# Patient Record
Sex: FEMALE
[De-identification: names, ages, dates, MRNs, and addresses within clinical notes are randomized per-mention and may not be internally consistent; named-entity substitution may affect disease eponyms.]

---

## 2018-02-16 PROBLEM — Z00.00 ENCOUNTER FOR PREVENTIVE HEALTH EXAMINATION: Status: ACTIVE | Noted: 2018-02-16

## 2018-06-28 ENCOUNTER — APPOINTMENT (OUTPATIENT)
Dept: NEUROLOGY | Facility: CLINIC | Age: 68
End: 2018-06-28
Payer: MEDICARE

## 2018-06-28 VITALS
HEIGHT: 66 IN | WEIGHT: 160 LBS | SYSTOLIC BLOOD PRESSURE: 104 MMHG | HEART RATE: 63 BPM | OXYGEN SATURATION: 96 % | BODY MASS INDEX: 25.71 KG/M2 | DIASTOLIC BLOOD PRESSURE: 62 MMHG | TEMPERATURE: 97.6 F

## 2018-06-28 VITALS — SYSTOLIC BLOOD PRESSURE: 96 MMHG | HEART RATE: 61 BPM | OXYGEN SATURATION: 96 % | DIASTOLIC BLOOD PRESSURE: 58 MMHG

## 2018-06-28 DIAGNOSIS — G20 PARKINSON'S DISEASE: ICD-10-CM

## 2018-06-28 PROCEDURE — 99215 OFFICE O/P EST HI 40 MIN: CPT

## 2018-06-28 RX ORDER — ATENOLOL 25 MG/1
25 TABLET ORAL DAILY
Refills: 0 | Status: ACTIVE | COMMUNITY

## 2018-06-28 RX ORDER — ATORVASTATIN CALCIUM 10 MG/1
10 TABLET, FILM COATED ORAL DAILY
Refills: 0 | Status: ACTIVE | COMMUNITY

## 2018-07-05 ENCOUNTER — RX RENEWAL (OUTPATIENT)
Age: 68
End: 2018-07-05

## 2018-07-20 ENCOUNTER — RX RENEWAL (OUTPATIENT)
Age: 68
End: 2018-07-20

## 2018-08-22 ENCOUNTER — MEDICATION RENEWAL (OUTPATIENT)
Age: 68
End: 2018-08-22

## 2018-10-03 ENCOUNTER — APPOINTMENT (OUTPATIENT)
Dept: NEUROLOGY | Facility: CLINIC | Age: 68
End: 2018-10-03
Payer: MEDICARE

## 2018-10-03 VITALS
HEART RATE: 62 BPM | OXYGEN SATURATION: 97 % | WEIGHT: 160 LBS | BODY MASS INDEX: 27.31 KG/M2 | DIASTOLIC BLOOD PRESSURE: 57 MMHG | HEIGHT: 64 IN | SYSTOLIC BLOOD PRESSURE: 95 MMHG

## 2018-10-03 VITALS
DIASTOLIC BLOOD PRESSURE: 55 MMHG | SYSTOLIC BLOOD PRESSURE: 91 MMHG | HEIGHT: 64 IN | HEART RATE: 61 BPM | OXYGEN SATURATION: 96 % | TEMPERATURE: 97.7 F | BODY MASS INDEX: 27.31 KG/M2 | WEIGHT: 160 LBS

## 2018-10-03 PROCEDURE — 99215 OFFICE O/P EST HI 40 MIN: CPT

## 2018-10-07 ENCOUNTER — EMERGENCY (EMERGENCY)
Facility: HOSPITAL | Age: 68
LOS: 1 days | Discharge: HOME | End: 2018-10-07
Attending: EMERGENCY MEDICINE
Payer: MEDICARE

## 2018-10-07 VITALS
HEART RATE: 63 BPM | DIASTOLIC BLOOD PRESSURE: 81 MMHG | SYSTOLIC BLOOD PRESSURE: 148 MMHG | TEMPERATURE: 98 F | RESPIRATION RATE: 16 BRPM | OXYGEN SATURATION: 100 %

## 2018-10-07 VITALS
DIASTOLIC BLOOD PRESSURE: 74 MMHG | OXYGEN SATURATION: 96 % | HEART RATE: 67 BPM | RESPIRATION RATE: 16 BRPM | WEIGHT: 160.06 LBS | SYSTOLIC BLOOD PRESSURE: 131 MMHG | TEMPERATURE: 98 F

## 2018-10-07 LAB
ALBUMIN SERPL ELPH-MCNC: 4.2 G/DL — SIGNIFICANT CHANGE UP (ref 3.3–5)
ALP SERPL-CCNC: 79 U/L — SIGNIFICANT CHANGE UP (ref 40–120)
ALT FLD-CCNC: 5 U/L — LOW (ref 10–45)
APPEARANCE UR: CLEAR — SIGNIFICANT CHANGE UP
APTT BLD: 31.8 SEC — SIGNIFICANT CHANGE UP (ref 27.5–37.4)
AST SERPL-CCNC: 15 U/L — SIGNIFICANT CHANGE UP (ref 10–40)
BACTERIA # UR AUTO: NEGATIVE — SIGNIFICANT CHANGE UP
BASOPHILS # BLD AUTO: 0.1 K/UL — SIGNIFICANT CHANGE UP (ref 0–0.2)
BASOPHILS NFR BLD AUTO: 1.3 % — SIGNIFICANT CHANGE UP (ref 0–2)
BILIRUB SERPL-MCNC: 0.4 MG/DL — SIGNIFICANT CHANGE UP (ref 0.2–1.2)
BILIRUB UR-MCNC: NEGATIVE — SIGNIFICANT CHANGE UP
BLD GP AB SCN SERPL QL: NEGATIVE — SIGNIFICANT CHANGE UP
BUN SERPL-MCNC: 17 MG/DL — SIGNIFICANT CHANGE UP (ref 7–23)
CALCIUM SERPL-MCNC: 9.9 MG/DL — SIGNIFICANT CHANGE UP (ref 8.4–10.5)
CHLORIDE SERPL-SCNC: 106 MMOL/L — SIGNIFICANT CHANGE UP (ref 96–108)
CO2 SERPL-SCNC: 29 MMOL/L — SIGNIFICANT CHANGE UP (ref 22–31)
COLOR SPEC: YELLOW — SIGNIFICANT CHANGE UP
CREAT SERPL-MCNC: 0.92 MG/DL — SIGNIFICANT CHANGE UP (ref 0.5–1.3)
DIFF PNL FLD: ABNORMAL
EOSINOPHIL # BLD AUTO: 0.2 K/UL — SIGNIFICANT CHANGE UP (ref 0–0.5)
EOSINOPHIL NFR BLD AUTO: 3.1 % — SIGNIFICANT CHANGE UP (ref 0–6)
EPI CELLS # UR: 1 /HPF — SIGNIFICANT CHANGE UP
GLUCOSE SERPL-MCNC: 99 MG/DL — SIGNIFICANT CHANGE UP (ref 70–99)
GLUCOSE UR QL: NEGATIVE — SIGNIFICANT CHANGE UP
HCT VFR BLD CALC: 42.2 % — SIGNIFICANT CHANGE UP (ref 34.5–45)
HGB BLD-MCNC: 14 G/DL — SIGNIFICANT CHANGE UP (ref 11.5–15.5)
HYALINE CASTS # UR AUTO: 0 /LPF — SIGNIFICANT CHANGE UP (ref 0–2)
INR BLD: 1.01 RATIO — SIGNIFICANT CHANGE UP (ref 0.88–1.16)
KETONES UR-MCNC: NEGATIVE — SIGNIFICANT CHANGE UP
LEUKOCYTE ESTERASE UR-ACNC: NEGATIVE — SIGNIFICANT CHANGE UP
LYMPHOCYTES # BLD AUTO: 1.7 K/UL — SIGNIFICANT CHANGE UP (ref 1–3.3)
LYMPHOCYTES # BLD AUTO: 26.3 % — SIGNIFICANT CHANGE UP (ref 13–44)
MCHC RBC-ENTMCNC: 30.3 PG — SIGNIFICANT CHANGE UP (ref 27–34)
MCHC RBC-ENTMCNC: 33.2 GM/DL — SIGNIFICANT CHANGE UP (ref 32–36)
MCV RBC AUTO: 91.2 FL — SIGNIFICANT CHANGE UP (ref 80–100)
MONOCYTES # BLD AUTO: 0.5 K/UL — SIGNIFICANT CHANGE UP (ref 0–0.9)
MONOCYTES NFR BLD AUTO: 7.4 % — SIGNIFICANT CHANGE UP (ref 2–14)
NEUTROPHILS # BLD AUTO: 4.1 K/UL — SIGNIFICANT CHANGE UP (ref 1.8–7.4)
NEUTROPHILS NFR BLD AUTO: 61.9 % — SIGNIFICANT CHANGE UP (ref 43–77)
NITRITE UR-MCNC: NEGATIVE — SIGNIFICANT CHANGE UP
PH UR: 6 — SIGNIFICANT CHANGE UP (ref 5–8)
PLATELET # BLD AUTO: 177 K/UL — SIGNIFICANT CHANGE UP (ref 150–400)
POTASSIUM SERPL-MCNC: 4.3 MMOL/L — SIGNIFICANT CHANGE UP (ref 3.5–5.3)
POTASSIUM SERPL-SCNC: 4.3 MMOL/L — SIGNIFICANT CHANGE UP (ref 3.5–5.3)
PROT SERPL-MCNC: 8.1 G/DL — SIGNIFICANT CHANGE UP (ref 6–8.3)
PROT UR-MCNC: NEGATIVE — SIGNIFICANT CHANGE UP
PROTHROM AB SERPL-ACNC: 10.9 SEC — SIGNIFICANT CHANGE UP (ref 9.8–12.7)
RBC # BLD: 4.62 M/UL — SIGNIFICANT CHANGE UP (ref 3.8–5.2)
RBC # FLD: 11.6 % — SIGNIFICANT CHANGE UP (ref 10.3–14.5)
RBC CASTS # UR COMP ASSIST: 39 /HPF — HIGH (ref 0–4)
RH IG SCN BLD-IMP: POSITIVE — SIGNIFICANT CHANGE UP
SODIUM SERPL-SCNC: 142 MMOL/L — SIGNIFICANT CHANGE UP (ref 135–145)
SP GR SPEC: 1.01 — SIGNIFICANT CHANGE UP (ref 1.01–1.02)
UROBILINOGEN FLD QL: NEGATIVE — SIGNIFICANT CHANGE UP
WBC # BLD: 6.6 K/UL — SIGNIFICANT CHANGE UP (ref 3.8–10.5)
WBC # FLD AUTO: 6.6 K/UL — SIGNIFICANT CHANGE UP (ref 3.8–10.5)
WBC UR QL: 2 /HPF — SIGNIFICANT CHANGE UP (ref 0–5)

## 2018-10-07 PROCEDURE — 85027 COMPLETE CBC AUTOMATED: CPT

## 2018-10-07 PROCEDURE — 80053 COMPREHEN METABOLIC PANEL: CPT

## 2018-10-07 PROCEDURE — 87086 URINE CULTURE/COLONY COUNT: CPT

## 2018-10-07 PROCEDURE — 85730 THROMBOPLASTIN TIME PARTIAL: CPT

## 2018-10-07 PROCEDURE — 86900 BLOOD TYPING SEROLOGIC ABO: CPT

## 2018-10-07 PROCEDURE — 86901 BLOOD TYPING SEROLOGIC RH(D): CPT

## 2018-10-07 PROCEDURE — 81001 URINALYSIS AUTO W/SCOPE: CPT

## 2018-10-07 PROCEDURE — 86850 RBC ANTIBODY SCREEN: CPT

## 2018-10-07 PROCEDURE — 99283 EMERGENCY DEPT VISIT LOW MDM: CPT

## 2018-10-07 PROCEDURE — 85610 PROTHROMBIN TIME: CPT

## 2018-10-07 PROCEDURE — 99284 EMERGENCY DEPT VISIT MOD MDM: CPT

## 2018-10-07 NOTE — ED PROVIDER NOTE - OBJECTIVE STATEMENT
67 yo post menopausal F with PMHx of parkinson's, HTN, HLD, hypothyroidism presents to ED with report of vaginal bleeding starting sometime during the night last nigh. Woke up and noticed she was wet and saw blood on her garments and sheet/bed. States the bleeding has been intermittent since then and she put a pad into place Has not saturated the pad. No hx of bleedng disorders. Denies any symptoms of chest pain, SOB, palpitations, lightheadedness, syncope. Has lower abdominal cramping that feels like menstrual period. No fever/chills or NVD. No dysuria. Has noticed incomplete emptying in the last few week. No hematuria prior to this am. Hx of BTL. Still has uterus. LNMP 15 years ago. No hx of DUB. Normal pap 6 years ago. Lives in [place]. [place] well yesterday. No recent illness.

## 2018-10-07 NOTE — ED ADULT NURSE NOTE - OBJECTIVE STATEMENT
68y f pt c/o vag bleeding x 1 week; pt states has been using pads and filling them; pt has not had period in over 15yrs; unsure exact age stopped menstruating; pt describes as bright red blood; pt denies abd pain/cramping; denies fever/chills; pt states is visiting from [place] and has no pmd here; aox3; no resp distress; no chest pain; abd soft nontender nondistended; no cva tenderness; no dysuria/hematuria; no n/v/d; no incontinence; + bs x 4; iv placed; labs drawn per md order; pt had friends at bedside; this RN at bedside while resident performed vag exam; bright red blood in vag vault on exam; safety/comfort maintained

## 2018-10-07 NOTE — ED PROVIDER NOTE - NS ED ROS FT
Constitutional: denies fevers, chills, night sweats, weight loss  HEENT: denies visual changes, hearing changes, rhinitis, odynophagia, or dysphagia  Cardiovascular: denies palpitations, chest pain, edema  Respiratory: denies SOB, wheezing  Gastrointestinal: denies N/V/D, + abd cramping  : denies dysuria, + hematuria, + vag bleeding  MSK: denies weakness, joint pain  Neuro: no numbness or tingling  Psych: no depression or anxiety  Skin: denies new rashes or masses

## 2018-10-07 NOTE — ED PROVIDER NOTE - PHYSICAL EXAMINATION
PHYSICAL EXAM:  GENERAL: NAD, well-groomed, well-developed  HEAD:  Atraumatic, Normocephalic  EYES: EOMI, PERRLA, conjunctiva and sclera clear, no conjunctival pallor   ENMT: No tonsillar erythema, exudates, or enlargement; Moist mucous membranes  NECK: Supple, No JVD,  HEART: Regular rate and rhythm; No murmurs, rubs, or gallops  RESPIRATORY: CTA B/L, No W/R/R  ABDOMEN: Soft, Nontender, Nondistended; Bowel sounds present  NEURO: A&Ox3, nonfocal, moving all extremities  EXTREMITIES:  2+ Peripheral Pulses, No clubbing, cyanosis, or edema  SKIN: warm, dry, normal color, no rash or abnormal lesions PHYSICAL EXAM:  GENERAL: NAD, well-groomed, well-developed  HEAD:  Atraumatic, Normocephalic  EYES: EOMI, PERRLA, conjunctiva and sclera clear, no conjunctival pallor   ENMT: No tonsillar erythema, exudates, or enlargement; Moist mucous membranes  NECK: Supple, No JVD,  HEART: Regular rate and rhythm; No murmurs, rubs, or gallops  RESPIRATORY: CTA B/L, No W/R/R  ABDOMEN: Soft, Nontender, Nondistended; Bowel sounds present  PELVIC: no lesons, minimal blood in vault, no active bleeding from os, no pooling, no clots  NEURO: A&Ox3, nonfocal, moving all extremities  EXTREMITIES:  2+ Peripheral Pulses, No clubbing, cyanosis, or edema  SKIN: warm, dry, normal color, no rash or abnormal lesions PHYSICAL EXAM:  GENERAL: NAD, well-groomed, well-developed  HEAD:  Atraumatic, Normocephalic  EYES: EOMI, PERRLA, conjunctiva and sclera clear, no conjunctival pallor   ENMT: No tonsillar erythema, exudates, or enlargement; Moist mucous membranes  NECK: Supple, No JVD,  HEART: Regular rate and rhythm; No murmurs, rubs, or gallops  RESPIRATORY: CTA B/L, No W/R/R  ABDOMEN: Soft, Nontender, Nondistended; Bowel sounds present  PELVIC: no lesons, minimal blood in vault, no active bleeding from os, no pooling, no clots, chaperone Jacklyn RN  NEURO: A&Ox3, nonfocal, moving all extremities  EXTREMITIES:  2+ Peripheral Pulses, No clubbing, cyanosis, or edema  SKIN: warm, dry, normal color, no rash or abnormal lesions

## 2018-10-07 NOTE — ED PROVIDER NOTE - ATTENDING CONTRIBUTION TO CARE
Mark - 67yo post menopausal F with PMHx of parkinson's, HTN, HLD, hypothyroidism presents to ED for vaginal bleeding since last night. Woke up and noticed she was wet and saw blood on her garments and sheet/bed. Has not saturated one pad since then. C/o mild lower abdo cramps, no other stx. VS wnl, abdom soft, nontender, pink conjunctivae. Will get labs, if w/u neg likely DC. Poss endometrial CA, will need biopsy outpatient. Pt lives in [place], will travel home in a few days, will see her GYN. Will get labs

## 2018-10-07 NOTE — ED ADULT NURSE NOTE - NSFALLRSKASSESASSIST_ED_ALL_ED
11:37 AM    Reason for Call: Phone Call    Description:  Pt called and states that she would like to see if she could get a call back regarding her tetanus shot and is she is up to date on them     Was an appointment offered for this call? No  If yes : Appointment type              Date    Preferred method for responding to this message: Telephone Call  What is your phone number ?[phone]    If we cannot reach you directly, may we leave a detailed response at the number you provided? Yes    Can this message wait until your PCP/provider returns, if available today? No,PCP is out     Mila Erickson       no

## 2018-10-07 NOTE — ED PROVIDER NOTE - PROGRESS NOTE DETAILS
[facility] PGY1- no sx at this time, bleeding not returned, feels well, discussed need for outpatient GYN f/u with ureteroscopy + bx

## 2018-10-07 NOTE — ED PROVIDER NOTE - MEDICAL DECISION MAKING DETAILS
[facility] PGY1 MDM- 67 yo F, post menopausal PD, HTN, HLD, vaginal bleeding since last night, first time, LNMP 15 years ago, has uterus, low abd cramping, no other sx, no AC, no know coagulopathy  pelvic exam showed minimal blood in vault, no active bleeding from os, no pooling, exam otherwise normal, VSS,   plan for urine/labs, likely d/c to gyn for DUB w/u with scope and bx  r/o hemorrhagic cystitis, vaginal/cervical mass, confirm bleeding is vaginal, not rectal

## 2018-10-07 NOTE — ED ADULT NURSE NOTE - NSIMPLEMENTINTERV_GEN_ALL_ED
Implemented All Universal Safety Interventions:  [place] to call system. Call bell, personal items and telephone within reach. Instruct patient to call for assistance. Room bathroom lighting operational. Non-slip footwear when patient is off stretcher. Physically safe environment: no spills, clutter or unnecessary equipment. Stretcher in lowest position, wheels locked, appropriate side rails in place.

## 2018-10-08 LAB
CULTURE RESULTS: NO GROWTH — SIGNIFICANT CHANGE UP
SPECIMEN SOURCE: SIGNIFICANT CHANGE UP

## 2018-10-09 RX ORDER — ATORVASTATIN CALCIUM 80 MG/1
1 TABLET, FILM COATED ORAL
Qty: 0 | Refills: 0 | COMMUNITY

## 2018-10-09 RX ORDER — LEVOTHYROXINE SODIUM 125 MCG
1 TABLET ORAL
Qty: 0 | Refills: 0 | COMMUNITY

## 2018-10-09 RX ORDER — ATENOLOL 25 MG/1
1 TABLET ORAL
Qty: 0 | Refills: 0 | COMMUNITY

## 2018-10-09 RX ORDER — CLONAZEPAM 1 MG
0 TABLET ORAL
Qty: 0 | Refills: 0 | COMMUNITY

## 2018-10-09 RX ORDER — ROPINIROLE 8 MG/1
0 TABLET, FILM COATED, EXTENDED RELEASE ORAL
Qty: 0 | Refills: 0 | COMMUNITY

## 2019-01-14 PROBLEM — G20 PARKINSON'S DISEASE: Chronic | Status: ACTIVE | Noted: 2018-10-07

## 2019-01-14 PROBLEM — E03.9 HYPOTHYROIDISM, UNSPECIFIED: Chronic | Status: ACTIVE | Noted: 2018-10-07

## 2019-01-14 PROBLEM — E78.5 HYPERLIPIDEMIA, UNSPECIFIED: Chronic | Status: ACTIVE | Noted: 2018-10-07

## 2019-01-14 PROBLEM — I10 ESSENTIAL (PRIMARY) HYPERTENSION: Chronic | Status: ACTIVE | Noted: 2018-10-07

## 2019-02-15 ENCOUNTER — APPOINTMENT (OUTPATIENT)
Dept: NEUROLOGY | Facility: CLINIC | Age: 69
End: 2019-02-15
Payer: MEDICARE

## 2019-02-15 VITALS
HEART RATE: 63 BPM | DIASTOLIC BLOOD PRESSURE: 74 MMHG | WEIGHT: 160 LBS | SYSTOLIC BLOOD PRESSURE: 133 MMHG | BODY MASS INDEX: 25.71 KG/M2 | HEIGHT: 66 IN

## 2019-02-15 PROCEDURE — 99215 OFFICE O/P EST HI 40 MIN: CPT

## 2019-02-15 NOTE — PHYSICAL EXAM
[FreeTextEntry1] : Patient with normal cognitive function, mild hypomimia with diminished blinking, mild reduction of voice volume.\par He has mild rigidity with some cogwheeling, present bilaterally, more evident on the right side.\par Mild intermittent tremor is present in the right hand, and occasionally in the left.\par There is mild loss of motor dexterity, with decrement at rapid sequential and rapid alternating movements, also slightly more evident on the right side.\par Foot tapping is slightly impaired, also with the right side more affected.\par Posture is mildly stooped, with some shortening of gait stride and mild asymmetry with right reduction.\par Postural reflexes are normal.\par There is mild generalized dyskinesias, more severe on the right side.

## 2019-02-15 NOTE — ASSESSMENT
[FreeTextEntry1] : 67 yo woman with PD since 2006 (diagnosed in 2007), recently  diagnosed with  endometrial cancer, had total hysterectomy on October 29 2018, which was successful, with no complications.A number of lymphonodus were removed which were cancer free. Completed then 5 sessions of radiation, with no complications. At this point she is considered cancer-free\par \par her PD has been stable through the process, but she has developed some dyskinesias.\par She also has developed some freezing of gait, occasional at this time.\par \par Discussed decreasing first dose of Stalevo to 75.\par Strongly emphasized walking. \par Otherwise Continue current medication regimen.\par \par Encouraged to increase exercise and physical activity and maintain an active social and intellectual life.\par More than 50% of the visit were dedicated to review of treatment, therapeutic plan, and prognosis, and patient was counseled on coping and physical and emotional wellbeing.\par

## 2019-02-15 NOTE — HISTORY OF PRESENT ILLNESS
[FreeTextEntry1] : 67 yo woman with PD since 2006 (diagnosed in 2007).\par She was recently diagnosed with  endometrial cancer, which was discovered a few days ago after an episode of bleeding, with subsequent biopsy. Patient had total hysterectomy on October 29 2018, which was successful, with no complications. However, a number of lymphonodus were removed which were cancer free. Completed 5 sessions of radiation, with no complications. At this point considered cancer-free\par \par Patient reports that she overall doing well.\par She complains of pain in the right shoulder, especially at night, aware when she wakes up in the morning.\par \par Her PD symptoms remain well controlled, occasionally freezing of gait.\par She takes a Rytary at night which has been effective.\par She has some dyskinesias in the morning, around 10 - 11 AM. 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

## 2019-02-27 ENCOUNTER — APPOINTMENT (OUTPATIENT)
Dept: NEUROLOGY | Facility: CLINIC | Age: 69
End: 2019-02-27

## 2019-03-19 ENCOUNTER — RX RENEWAL (OUTPATIENT)
Age: 69
End: 2019-03-19

## 2019-03-19 ENCOUNTER — CLINICAL ADVICE (OUTPATIENT)
Age: 69
End: 2019-03-19

## 2019-06-27 ENCOUNTER — APPOINTMENT (OUTPATIENT)
Dept: NEUROLOGY | Facility: CLINIC | Age: 69
End: 2019-06-27
Payer: MEDICARE

## 2019-06-27 VITALS — DIASTOLIC BLOOD PRESSURE: 63 MMHG | SYSTOLIC BLOOD PRESSURE: 106 MMHG | HEART RATE: 62 BPM

## 2019-06-27 VITALS
TEMPERATURE: 97.9 F | BODY MASS INDEX: 25.71 KG/M2 | SYSTOLIC BLOOD PRESSURE: 113 MMHG | OXYGEN SATURATION: 97 % | HEART RATE: 60 BPM | WEIGHT: 160 LBS | DIASTOLIC BLOOD PRESSURE: 62 MMHG | HEIGHT: 66 IN

## 2019-06-27 PROCEDURE — 99214 OFFICE O/P EST MOD 30 MIN: CPT

## 2019-06-27 RX ORDER — CARBIDOPA, LEVODOPA, AND ENTACAPONE 25; 100; 200 MG/1; MG/1; MG/1
25-100-200 TABLET, FILM COATED ORAL 4 TIMES DAILY
Qty: 120 | Refills: 3 | Status: DISCONTINUED | COMMUNITY
Start: 2019-03-21 | End: 2019-06-27

## 2019-06-27 RX ORDER — CARBIDOPA, LEVODOPA AND ENTACAPONE 18.75; 75; 2 MG/1; MG/1; MG/1
18.75-75-2 TABLET, FILM COATED ORAL DAILY
Qty: 30 | Refills: 11 | Status: DISCONTINUED | COMMUNITY
End: 2019-06-27

## 2019-06-27 RX ORDER — LEVODOPA AND CARBIDOPA 95; 23.75 MG/1; MG/1
23.75-95 CAPSULE, EXTENDED RELEASE ORAL DAILY
Qty: 30 | Refills: 11 | Status: DISCONTINUED | COMMUNITY
Start: 2018-10-03 | End: 2019-06-27

## 2019-06-27 RX ORDER — CARBIDOPA, LEVODOPA AND ENTACAPONE 25; 100; 200 MG/1; MG/1; MG/1
25-100-200 TABLET, FILM COATED ORAL
Qty: 180 | Refills: 3 | Status: DISCONTINUED | COMMUNITY
End: 2019-06-27

## 2019-06-27 NOTE — PHYSICAL EXAM
[FreeTextEntry1] : \par Patient with normal cognitive function, mild hypomimia with diminished blinking, mild reduction of voice volume.\par He has mild rigidity with some cogwheeling, present bilaterally, more evident on the right side.\par Mild intermittent tremor is present in the right hand, and occasionally in the left.\par There is mild loss of motor dexterity, with decrement at rapid sequential and rapid alternating movements, also slightly more evident on the right side.\par Foot tapping is slightly impaired, also with the right side more affected.\par Posture is mildly stooped, with some shortening of gait stride and mild asymmetry with right reduction.\par Postural reflexes are normal.\par There is mild generalized dyskinesias, more severe on the right side. \par  \par

## 2019-06-27 NOTE — ASSESSMENT
[FreeTextEntry1] : 69 yo woman with PD since 2006 (diagnosed in 2007), recently diagnosed with endometrial cancer, had total hysterectomy on October 29 2018, which was successful, with no complications.A number of lymphonodus were removed which were cancer free. Completed then 5 sessions of radiation, with no complications. At this point she is considered cancer-free\par \par her PD has been stable through the process, but she has developed some dyskinesias.\par She also has developed some freezing of gait, occasional at this time.\par \par Given Inbrija for wearing OFF episodes and stressful social situations.\par Think about possible increasing Stalevo 100 to 4 hr intervals (6 am, 10 am, 2 pm , 6pm)  and Sinemet CR HS?\par Think about Amantadine for dyskinesias?\par Spoke about looking for a PD specialist in [place].\par Strongly emphasized walking. \par Otherwise Continue current medication regimen.\par \par Encouraged to increase exercise and physical activity and maintain an active social and intellectual life.\par More than 50% of the visit were dedicated to review of treatment, therapeutic plan, and prognosis, and patient was counseled on coping and physical and emotional wellbeing.\par

## 2019-06-27 NOTE — HISTORY OF PRESENT ILLNESS
----- Message from Víctor Estrada sent at 6/19/2019  5:00 PM CDT -----    Rec'bryce call from pt daughter and they will take the appts [facility] for 7/18,7/19 & 7/22. Also sent her an e-mail with hotels close by.   
[FreeTextEntry1] : 68 yr RH female patient was diagnosed with Parkinson's disease in 2007 with symptoms appearing in 2006. She was recently diagnosed with endometrial cancer, had total hysterectomy on October 29 2018, which was successful, with no complications.A number of lymphonodus were removed which were cancer free. Completed then 5 sessions of radiation, with no complications. At this point she is considered cancer-free\par \par \par Since the last visit, pt states she has stopped Rytary due to an increase of dyskinesias. \par \par Overall, she states that all of her symptoms have worsened since her last visit. Pt states her gait and balance are impaired.  Pt states she has had one fall one month ago. She was walking in the garden, lost her balance, and fell face forward. P denies any LOC, lacerations, fractures, or other traumatic brain injuries.The mornings are the worst times for her, she feels very stiff and pain in her R shoulder.\par Pt states she forgets a dose of medication (typically either afternoon or evening dose) about 2- 3\par x a week. She has experienced an increase freezing of gait and L hand (minimal in R hand) tremors when she forgets to take her medications. She states she also experiences dyskinesias almost daily after taking medications. \par \par Pt  states she has noticed worsening of her bradykinetic movements and clumsiness. She states she is still able to perform most ADLs independently but at a slower rate and now needs assistance with dressing and often finds herself dropping food. Pt states she is very slow at typing and has noticed more mistakes. \par \par Pt states she experiences dizziness and  lightheadedness related to changing positions weekly. She denies near syncopal episodes related to OH. Today BP is 113/62 sitting and 106/63 standing. She is currently taking Atenolol 25 mg QD for A Fib.\par \par Pt states she is not currently active beside walking in her garden occasionally. She recently joined a gym and plans to work out more regularly.\par \par Pt states sleep is normal with about 3-4 hr/night. Pt states she experiences nightmares and vivid dreams nightly. Pt states talking in her sleep, occasional and minimal acting out dreams movements. Pt currently taking Clonazepam 1 mg HS. \par Pt states she experiences constipation. She was recently placed on Linzess 71 mg QD or BID PRN and occasional Ducolox. \par Pt states she experiences urinary urgency and frequency during the day.\par Pt denies any recent changes in vision. \par Pt states she feels depressed and anxious. \par Pt states she experiences daytime somnolence.  She occasionally naps during the day.

## 2019-10-02 ENCOUNTER — APPOINTMENT (OUTPATIENT)
Dept: NEUROLOGY | Facility: CLINIC | Age: 69
End: 2019-10-02
Payer: MEDICARE

## 2019-10-02 VITALS — OXYGEN SATURATION: 96 % | SYSTOLIC BLOOD PRESSURE: 91 MMHG | HEART RATE: 55 BPM | DIASTOLIC BLOOD PRESSURE: 60 MMHG

## 2019-10-02 VITALS — SYSTOLIC BLOOD PRESSURE: 103 MMHG | HEART RATE: 60 BPM | DIASTOLIC BLOOD PRESSURE: 65 MMHG | OXYGEN SATURATION: 94 %

## 2019-10-02 PROCEDURE — 99215 OFFICE O/P EST HI 40 MIN: CPT

## 2019-10-02 NOTE — HISTORY OF PRESENT ILLNESS
[FreeTextEntry1] : 68 yr RH female patient was diagnosed with Parkinson's disease in 2007 with symptoms appearing in 2006. She was recently diagnosed with endometrial cancer, had total hysterectomy on October 29 2018, which was successful, with no complications.A number of lymphonodus were removed which were cancer free. Completed then 5 sessions of radiation, with no complications. At this point she is considered cancer-free.\par \par Since last visit, pt was recently changed to Stalevo 100 QID, she states she has noticed an improvement with her symptoms.\par \par Overall, she states her gait and balance are impaired. Pt states she has noticed worsening of her bradykinetic movements and clumsiness. Pt is currently using no assistive devices. She has experienced an increase freezing of gait occasionally. She states she struggles to begin walking once getting up from a chair but it becomes easier after walking for a bit. Pt states she continues to have L hand (minimal in R hand)  and L leg tremors especially when she forgets to take her medications. Pt states that she has noticed a new occurrence of her knees buckling almost daily now. She states this specifically occurs most when she is late on taking her medication. Pt denies any recent falls. Pt does states she has had about 2-3 near falls at home. She states most near falls occur going down the stairs. The mornings are difficulty but she also notices more movement difficulty in the afternoon. She states she also experiences dyskinesias almost daily when taking meds early or with anxiety. She states she has dyskinesias at least once a week. She states she is still able to perform most ADLs independently but at a slower rate and now needs assistance with dressing especially with buttons and often finds herself dropping food when feeding herself.\par \par Pt states she experiences dizziness and lightheadedness related to changing positions weekly. She denies near syncopal episodes related to OH. Today BP is 103/65 sitting and 91/60 standing. She is currently taking Atenolol 25 mg QD for A Fib. Pt states she noticd recent labile BPs including 80/55 to 200/110 on different days. The high BP occurred twice. Her hypotension occurs most days. \par \par Pt states she is not currently active beside walking in her garden occasionally. She recently joined a gym but she has struggles with treadmills so has not been.\par Pt not currently in PT, OT, or speech.\par \par Pt states sleep is poor with 3-4 hr/night. Pt states she has increased difficulty turning in bed and getting out and in of bed. Pt states she experiences nightmares and vivid dreams nightly. Pt states talking in her sleep, occasional and minimal acting out dreams movements. Pt currently taking Clonazepam 1 mg HS. \par Pt states she experiences constipation. BMs once every 2-3 days. She was recently started on Miralax 1 cup daily. \par Pt states she experiences urinary urgency and frequency during the day. Pt states she experiences urinary incontinence about 1 a week. She is currently wearing briefs.\par Pt has recently experienced difficulty with dry eyes and  . She has a history of bilateral cataract surgery.  \par Pt states she feels depressed and anxious. She is not currently on any med. She is currently using [facility] [employer] through her insurance to speak with a therapist.\par Pt states she experiences daytime somnolence. She occasionally naps during the day. \par  \par

## 2019-10-02 NOTE — PHYSICAL EXAM
[FreeTextEntry1] : Patient with normal cognitive function, mild hypomimia with diminished blinking, mild reduction of voice volume.\par He has mild rigidity with some cogwheeling, present bilaterally, more evident on the right side.\par Mild intermittent tremor is present in the right hand, and occasionally in the left.\par There is mild loss of motor dexterity, with decrement at rapid sequential and rapid alternating movements, also slightly more evident on the right side.\par Foot tapping is slightly impaired, also with the right side more affected.\par Posture is mildly stooped, with shortening of gait stride and mild asymmetry with right reduction.\par Postural reflexes are normal.\par There is mild generalized dyskinesias, more severe on the right side.

## 2019-10-02 NOTE — ASSESSMENT
[FreeTextEntry1] : 68 yo woman with PD since 2006 (diagnosed in 2007), recently diagnosed with endometrial cancer, had total hysterectomy on October 29 2018, which was successful, with no complications.A number of lymphonodus were removed which were cancer free. Completed then 5 sessions of radiation, with no complications. At this point she is considered cancer-free, and had a follow-up few days ago with her oncologist.\par \par her PD has been stable through the process, but she has developed some dyskinesias.\par She also has developed some freezing of gait, occasional at this time.\par \par Given Inbrija for wearing OFF episodes and stressful social situations.\par Change Stalevo 100 to 4 hr intervals (6 am, 10 am, 2 pm , 6pm) and Sinemet CR at 1 AM when she goes to bed.\par \par Think about Amantadine for dyskinesias\par Awaiting to be involved with a PD specialist in [place].\par Emphasized walking. \par \par Encouraged to increase exercise and physical activity and maintain an active social and intellectual life.\par \par Otherwise Continue current medication regimen.\par \par Encouraged to increase exercise and physical activity and maintain an active social and intellectual life.\par More than 50% of the visit were dedicated to review of treatment, therapeutic plan, and prognosis, and patient was counseled on coping and physical and emotional wellbeing.\par  \par

## 2019-10-02 NOTE — HISTORY OF PRESENT ILLNESS
[FreeTextEntry1] : 68 yr RH female patient was diagnosed with Parkinson's disease in 2007 with symptoms appearing in 2006. She was recently diagnosed with endometrial cancer, had total hysterectomy on October 29 2018, which was successful, with no complications.A number of lymphonodus were removed which were cancer free. Completed then 5 sessions of radiation, with no complications. At this point she is considered cancer-free.\par \par Since last visit, pt was recently changed to Stalevo 100 QID, she states she has noticed an improvement with her symptoms.\par \par Overall, she states her gait and balance are impaired. Pt states she has noticed worsening of her bradykinetic movements and clumsiness. Pt is currently using no assistive devices. She has experienced an increase freezing of gait occasionally. She states she struggles to begin walking once getting up from a chair but it becomes easier after walking for a bit. Pt states she continues to have L hand (minimal in R hand)  and L leg tremors especially when she forgets to take her medications. Pt states that she has noticed a new occurrence of her knees buckling almost daily now. She states this specifically occurs most when she is late on taking her medication. Pt denies any recent falls. Pt does states she has had about 2-3 near falls at home. She states most near falls occur going down the stairs. The mornings are difficulty but she also notices more movement difficulty in the afternoon. She states she also experiences dyskinesias almost daily when taking meds early or with anxiety. She states she has dyskinesias at least once a week. She states she is still able to perform most ADLs independently but at a slower rate and now needs assistance with dressing especially with buttons and often finds herself dropping food when feeding herself.\par \par Pt states she experiences dizziness and lightheadedness related to changing positions weekly. She denies near syncopal episodes related to OH. Today BP is 103/65 sitting and 91/60 standing. She is currently taking Atenolol 25 mg QD for A Fib. Pt states she noticd recent labile BPs including 80/55 to 200/110 on different days. The high BP occurred twice. Her hypotension occurs most days. \par \par Pt states she is not currently active beside walking in her garden occasionally. She recently joined a gym but she has struggles with treadmills so has not been.\par Pt not currently in PT, OT, or speech.\par \par Pt states sleep is poor with 3-4 hr/night. Pt states she has increased difficulty turning in bed and getting out and in of bed. Pt states she experiences nightmares and vivid dreams nightly. Pt states talking in her sleep, occasional and minimal acting out dreams movements. Pt currently taking Clonazepam 1 mg HS. \par Pt states she experiences constipation. BMs once every 2-3 days. She was recently started on Miralax 1 cup daily. \par Pt states she experiences urinary urgency and frequency during the day. Pt states she experiences urinary incontinence about 1 a week. She is currently wearing briefs.\par Pt has recently experienced difficulty with dry eyes and  . She has a history of bilateral cataract surgery.  \par Pt states she feels depressed and anxious. She is not currently on any med. She is currently using [employer] through her insurance to speak with a therapist.\par Pt states she experiences daytime somnolence. She occasionally naps during the day. \par  \par

## 2019-10-03 NOTE — ED ADULT NURSE NOTE - NS ED NURSE LEVEL OF CONSCIOUSNESS AFFECT
Calm
no
Eucrisa Counseling: Patient may experience a mild burning sensation during topical application. Eucrisa is not approved in children less than 2 years of age.

## 2019-10-10 ENCOUNTER — APPOINTMENT (OUTPATIENT)
Dept: NEUROLOGY | Facility: CLINIC | Age: 69
End: 2019-10-10
Payer: MEDICARE

## 2019-10-10 VITALS — DIASTOLIC BLOOD PRESSURE: 60 MMHG | SYSTOLIC BLOOD PRESSURE: 104 MMHG

## 2019-10-10 VITALS
HEIGHT: 66 IN | OXYGEN SATURATION: 97 % | BODY MASS INDEX: 25.39 KG/M2 | SYSTOLIC BLOOD PRESSURE: 120 MMHG | HEART RATE: 60 BPM | DIASTOLIC BLOOD PRESSURE: 64 MMHG | WEIGHT: 158 LBS

## 2019-10-10 PROCEDURE — 99215 OFFICE O/P EST HI 40 MIN: CPT

## 2019-10-10 PROCEDURE — ZZZZZ: CPT

## 2019-10-22 ENCOUNTER — OTHER (OUTPATIENT)
Age: 69
End: 2019-10-22

## 2019-11-05 ENCOUNTER — MOBILE ON CALL (OUTPATIENT)
Age: 69
End: 2019-11-05

## 2019-11-18 ENCOUNTER — OTHER (OUTPATIENT)
Age: 69
End: 2019-11-18

## 2019-11-18 DIAGNOSIS — G20 DYSTONIA, UNSPECIFIED: ICD-10-CM

## 2019-11-18 DIAGNOSIS — G24.9 DYSTONIA, UNSPECIFIED: ICD-10-CM

## 2020-01-07 ENCOUNTER — RX RENEWAL (OUTPATIENT)
Age: 70
End: 2020-01-07

## 2020-01-13 NOTE — HISTORY OF PRESENT ILLNESS
[Clinic: ([facility])___] : at the clinic in [unfilled], at the time of the visit. [Patient, Provider & Others:  (Enter provider's Role) ____] : The patient, [unfilled], [unfilled] ~ecp~  [Spouse] : spouse [FreeTextEntry1] : 69 year RH female patient was diagnosed with Parkinson's disease in 2007 with symptoms appearing in 2006. Additionally, she was recently diagnosed with endometrial cancer, had total hysterectomy on October 29 2018, which was successful, with no complications. A number of lymphonodus were removed which were cancer free. Completed then 5 sessions of radiation, with no complications. At this point she is considered cancer-free.\par \par Ms. Bell is being referred by Dr. Garcia,  movement disorder  specialist,for an evaluation of the neurorehabilitation  potential due to loss of functional independence and  the progressive functional decline started about a year ago but worsened over the past 3-4 months. \par \par Pt has noticed a function decline about a year ago but worsened over the past 3-4 months. \par Overall, she states her gait and balance are impaired. Pt states she has noticed worsening of her bradykinetic movements and clumsiness. She states she has LEs weakness with heaviness that severely impacts her daily life. Pt is currently using no assistive devices but she states she lives a very sedentary life. She states she has canes but does not feels safe using them and therefore forced to limit her mobility. She has been experiencing increased freezing of gait and shuffling almost daily. Pt states she now necessitates help turning in bed, getting in and out of bed and getting up from a chair daily. She states this specifically occurs most when she is late on taking her medication. Pt states she has had about 3-4 bad  falls this year. Pt states she has had about a dozen near falls this year. She states most near falls occur going down the stairs. Her most recent fall occurred on Monday when she was trying to stand up from a sitting position  and  fell backwards. She was not evaluated by a doctor after  the fall but admits to new onset of lower back pain since than. She fell in the recent past  in [place] while going down a flight of stairs and she broke L ankle.Generally, after falls  Pt states she can not get up alone and requires assistance. Pt states that she struggles going down stairs due to difficulty estimating where to take the next step. She meeds up to 25- 50 % help of another person  with ambulating , transfers and negotiating  stairs.\par \par The mornings and late afternoon are difficult. She states she also experiences increased dyskinesias almost daily, especially with increased anxiety. Pt states she continues to have L hand > R hand tremors and L leg tremors which worsens when she forgets to take her medications. Pt states she now makes a lot of mistakes when typing. She states performing most ADLs now requires assistance with dressing especially with buttons and often finds herself dropping food when feeding herself. She states she is now very clumsy eating and embarrassed eating in front of others now. Pt states she needs assistance putting soap on herself and drying herself when getting out of the shower.\par \par Pt states she uses a grab bar and shower chair. \par Pt lives in a two story house with her . There are two steps to go into the house, then 10 steps to change levels. Pt states she is forced  to remain in the bottom two floors. She does not have help at home besides her 's assistance.\par \par Pt states her ability articulate or find words has worsened recently. Her  has also noticed a decrease in volume of speech. \par \par Pt states she experiences dizziness and lightheadedness related to changing positions weekly. She denies  syncopal episodes related to OH, but described numerous near syncopal events with compromised gait stability. Today BP is 120/64 sitting and 104/60 standing. She is currently taking Atenolol 25 mg QD for A Fib (split does 12.5 BID). \par \par Pt not currently in PT, OT, or ST. She participated in the outpatient programs in the recent past without meaningful benefit.\par \par Pt states sleep is poor with 3-4 hr/night. Pt states she has increased difficulty turning in bed and getting out and in of bed which requires assistance. Pt states she experiences nightmares and vivid dreams nightly. Pt states talking in her sleep, occasional and minimal acting out dreams movements. Pt currently taking Clonazepam 1 mg HS. \par Pt states she experiences constipation. BMs once every 2-3 days. She was recently started on Miralax 1 cup daily. \par Pt states she experiences urinary urgency and frequency during the day. Pt states she experiences urinary incontinence about 1 a week. She is currently wearing briefs.\par Pt states she has significant difficulty remembering peoples names.\par Pt has recently experienced difficulty with dry eyes and . She has a history of bilateral cataract surgery. \par Pt states she feels depressed and anxious. She is not currently on any medication for mod disorder, but is willing to try. She is currently using "[employer]" through her insurance to speak with a therapist but she states it's not very helpful.\par Pt states she experiences daytime somnolence. She occasionally naps during the day. \par  \par Current functional status evaluation:\par \par Ambulatory Distance & Assistive Devices: MIN/ MOD hand assist\par Falls ( frequent with  injuries/ fractures) and numerous near fall episodes\par Transfers ( bed, chair, WC) MIN/CG Assist\par Stairs ( 2 handrails used, 15 steps in the house,  no chair glide) MIN/ MOD Assist\par \par UE dsg ( shirt, buttons, bra): MIN Assist\par LE dsg (underwear, pants, zipper,socks.shoes) MIN Assist\par \par Toileting ( managing clothing, wiping self): CG\par Bladder function ( 1-2 accidents per week,  frequency, urgency,no retention)\par Bowel function ( constipation with use of laxatives)\par \par Eating ( loading food onto utensils, bringing to mouth, opening packages, concerns with food pocketing): MOdified Independent \par Groom/Bath ( washing hands,face,brushing hair, teeth, shaving, bathing body parts): MIN/ MOD Assist\par \par Diet Type ( consistency, fluids): regular solids with thin liquids \par Dysphagia: frequent throat clearing \par Hypophonia and dysarthria\par \par Motor Fluctuations affecting Quality of Life:\par -tremor\par -postural instability\par -'wearing off" phenomenon \par -unpredictable "off"\par -freezing gait\par -failure of  "on"\par -dyskinesias\par \par \par Autonomic and  Non-motor complications: \par -constipation\par -urinary incontinence, urgency and frequency \par -orthostatic hypotension\par -sleep disturbances( REM sleep behavior disturbances, insomnia, excessive sleepiness/sleep attacks):\par -gastroparesis \par -fatigue\par \par \par \par Cognitive Impairment: \par -memory is declining\par -problem solving is impaired\par -visuospatial activities are limited\par -social interaction limited \par -meaningful activities/ hobbies  patient enjoys are limited\par - limited employment ( writer, unable to type, write well)\par \par Mood disturbances/Psychotic disturbances\par  -depression:\par -anxiety:\par \par \par Home environment \par  -private home, three story house with 5  stairs to enter,. 15 internal stairs, 2 handrails,  lack of DME at home)\par \par Support system\par - lives with \par -assistance at home, is insufficient\par -support system ( family, friends) is limited\par \par Patient's goals for functional improvement: " I want to walk independently. I don’t want to be afraid to leave the house"\par \par Home barriers (  > 15 steps, split level house,  lack of adaptive equipment at home,poor bathroom /bedroom accommodations, limited family support, fall risk, impulsivity, cognitive impairment)\par \par Current/ recent PT/OT/ST as outpatient or at home: Failed outpatient PT/ OT in the past. \par \par Existing comorbidities contributing to poor mobility/ fall risk/ proper posture/ endurance:\par  - DM, Diabetic peripheral neuropathy\par  - DSD/ LBP/ radiculopathy LBP after falls\par  - OA with history of knee replacement\par  - HTN/orthostatic hypotension poorly controlled\par  - Anemia\par  - Arrhythmia, possible AFib\par  - recent trauma, fall injuries/ ankle fracture\par  - Dysphagia, hypophonia\par  - Urinary  incontinence, urgency, frequency\par  - Severe constipation\par  - Visual impairment ( cataracts, poor visual acuity) \par  - Depression, anxiety\par  - Cognitive/ behavioral impairment \par \par \par \par Medical History \par Mitral valve prolapse\par Tachycardia- possible A Fib\par Cataracts bilateral; 2008 and 2014\par Endometrial cancer; October 2018\par Osteoarthritis of LEs and fingers\par \par Surgical History\par Bilateral cataract surgery; 2008 and 2014\par Total hysterectomy; October 2018\par Knee replacement- R knee; 2012\par \par Allergies\par NKDA\par \par

## 2020-01-13 NOTE — PHYSICAL EXAM
[FreeTextEntry1] : Patient with mildly impaired cognitive function, mild hypomimia with diminished blinking, mild reduction of voice volume.\par He has mild rigidity with some cogwheeling, present bilaterally, more evident on the right side.\par Mild intermittent tremor is present in the right hand, and occasionally in the left.\par There is  loss of motor dexterity, with decrement at rapid sequential and rapid alternating movements, also slightly more evident on the right side.\par Foot tapping is  impaired, also with the right side more affected.\par Posture is  stooped, with shortening of gait stride and mild asymmetry with right reduction.\par Postural reflexes are normal.\par There is mild generalized dyskinesias, more severe on the right side. \par

## 2020-01-21 ENCOUNTER — RX RENEWAL (OUTPATIENT)
Age: 70
End: 2020-01-21

## 2020-04-09 ENCOUNTER — APPOINTMENT (OUTPATIENT)
Dept: NEUROLOGY | Facility: CLINIC | Age: 70
End: 2020-04-09

## 2020-04-10 ENCOUNTER — APPOINTMENT (OUTPATIENT)
Dept: NEUROLOGY | Facility: CLINIC | Age: 70
End: 2020-04-10
Payer: MEDICARE

## 2020-04-10 PROCEDURE — G2012 BRIEF CHECK IN BY MD/QHP: CPT

## 2020-04-10 NOTE — ASSESSMENT
[FreeTextEntry1] : 70 yo woman with PD since 2006 (diagnosed in 2007), recently diagnosed with endometrial cancer, had total hysterectomy on October 29 2018, which was successful, with no complications.A number of lymphonodus were removed which were cancer free. Completed then 5 sessions of radiation, with no complications. At this point she is considered cancer-free, and had a follow-up few days ago with her oncologist. Her PD has been stable through the process, but she has developed some dyskinesias.\par She also has developed some freezing of gait.\par \par Since starting Gocovri and adding the CR Sinemet she has been doing very well.\par Only concern is mild  return of dyskinesias.\par \par  Reccomend:\par Stalevo 100 to 4 hr intervals (6 am, 10 am, 2 pm , 6 pm,  and 1/2 tablet at 11 PM) \par  Sinemet CR at 1 -2  AM when she goes to bed.\par Gocovri 1 tablet per day\par Inbrija for OFF episodes \par \par Encouraged to increase exercise and physical activity and maintain an active social and intellectual life, compatible with COVID restrictions\par \par More than 50 % of time was used to discuss treatment, therapeutic plan, and prognosis, and patient was counseled on lifestyle, coping, and physical and emotional wellbeing.\par

## 2020-04-10 NOTE — HISTORY OF PRESENT ILLNESS
[Home] : at home, [unfilled] , at the time of the visit. [Other Location: e.g. Home (Enter Location, City,State)___] : at [unfilled] [Patient] : the patient [FreeTextEntry1] : Technical difficulty during for the remote, can establish audio connection. Visit conducted by audio only\par \par 68 yo woman with PD since 2006 (diagnosed in 2007), recently diagnosed with endometrial cancer, had total hysterectomy on October 29 2018, which was successful, with no complications.A number of lymphonodus were removed which were cancer free. Completed then 5 sessions of radiation, with no complications. At this point she is considered cancer-free, and had a follow-up few days ago with her oncologist. Her PD has been stable through the process, but she has developed some dyskinesias.\par She also has developed some freezing of gait.\par \par Since starting Gocovri and adding the CR Sinemet she has been doing very well.\par Very mild residual dyskinesias, although over the past days there has been some return of the dyskinesias.\par Virtually no Off periods, and she has had good control of her motor symptoms.\par  No significant non-motor symptoms\par Skeep is better\par \par Current Medications:\par  Stalevo 100 to 4 hr intervals (6 am, 10 am, 2 pm , 6 pm, 11 PM) \par  Sinemet CR at 1 -2  AM when she goes to bed.\par Gocovri 1 tablet per day\par Inbrija for OFF episodes \par \par

## 2020-04-30 ENCOUNTER — APPOINTMENT (OUTPATIENT)
Dept: NEUROLOGY | Facility: CLINIC | Age: 70
End: 2020-04-30
Payer: MEDICARE

## 2020-04-30 PROCEDURE — 99214 OFFICE O/P EST MOD 30 MIN: CPT | Mod: 95

## 2020-04-30 RX ORDER — CARBIDOPA, LEVODOPA, AND ENTACAPONE 18.75; 75; 2 MG/1; MG/1; MG/1
18.75-75-2 TABLET, FILM COATED ORAL
Qty: 120 | Refills: 11 | Status: DISCONTINUED | COMMUNITY
Start: 2019-10-02 | End: 2020-04-30

## 2020-04-30 RX ORDER — ISTRADEFYLLINE 20 MG/1
20 TABLET, FILM COATED ORAL
Qty: 90 | Refills: 3 | Status: DISCONTINUED | COMMUNITY
Start: 2020-01-07 | End: 2020-04-30

## 2020-04-30 NOTE — ASSESSMENT
[FreeTextEntry1] : 70 yo woman with PD since 2006 (diagnosed in 2007), recently diagnosed with endometrial cancer, had total hysterectomy on October 29 2018, which was successful, with no complications. A number of lymphonodus were removed which were cancer free. Completed then 5 sessions of radiation, with no complications. At this point she is considered cancer-free, and had a follow-up few days ago with her oncologist. Her PD has been stable through the process, but she has developed some dyskinesias that have now been well controlled since start Gocovri. \par \par Since adjusting her Stalevo to 5 times a day she has been doing very well. Freezing of gait is under better control. \par Advised that patients with PD can lose weight and to continue to monitor and discuss with her PCP/oncologist as well.\par \par Recommend:\par Stalevo 100mg 5 times a day (6/7 am, 10/11 am, 3 pm , 7 pm,  and 11 PM). Advised her to take her medications at consistent intervals every 4 hours. \par Sinemet CR at 1 -2  AM when she goes to bed.\par Gocovri 1 tablet per day\par Inbrija for OFF episodes \par Continue Clonazepam 1mg at bedtime. Advised on good sleep hygiene. She is not interested in anything additional for sleep at this time. Will continue to monitor and add sleep aid if necessary.\par Continue Rasagiline\par \par Patient is scheduled for [facility] once clearance is given in relation to the pandemic.\par \par Encouraged to increase exercise and physical activity and maintain an active social and intellectual life, compatible with COVID restrictions.\par \par More than 50% of time was used to discuss treatment, therapeutic plan, and prognosis, and patient was counseled on lifestyle, coping, and physical and emotional wellbeing.

## 2020-04-30 NOTE — END OF VISIT
[] : Fellow [>50% of Time Spent on Counseling and Coordination of Care for  ___] : Greater than 50% of the encounter time was spent on counseling and coordination of care for [unfilled] [Time Spent: ___ minutes] : I have spent [unfilled] minutes of face to face time with the patient [FreeTextEntry3] : Fellow present and participated to televisit, Patient's history reviewed with fellow, patient video examined with fellow, assessment and plan discussed with fellow.\par

## 2020-04-30 NOTE — PHYSICAL EXAM
[FreeTextEntry1] : Face is mildly masked, with decreased blinking rate. Voice volume is normal. \par No tremors observed today. \par Bradykinesia is present with finger tapping, rapid alternating and sequential movements, left more affected than right. \par No dysdiadochokinesia. \par No trouble standing with arms crossed. Posture is minimally stooped. Patient walks cautiously, with decreased arm swing bilaterally, mild reduction in stride.\par Mild upper body dyskinesias, not bothersome to the patient.\par \par Unable to test for postural reflexes and rigidity over televisit.

## 2020-04-30 NOTE — HISTORY OF PRESENT ILLNESS
[Home] : at home, [unfilled] , at the time of the visit. [Other Location: e.g. Home (Enter Location, City,State)___] : at [unfilled] [Patient] : the patient [Spouse] : spouse [FreeTextEntry1] : 70 yo woman with PD since 2006 (diagnosed in 2007), recently diagnosed with endometrial cancer, had total hysterectomy on October 29 2018, which was successful, with no complications.A number of lymphonodus were removed which were cancer free. Completed then 5 sessions of radiation, with no complications. At this point she is considered cancer-free, and had a follow-up few days ago with her oncologist. Her PD has been stable through the process, but she has developed some dyskinesias.\par She also has developed some freezing of gait.\par \par Since starting Gocovri and adding the CR Sinemet she has been doing very well. Dyskinesias are well controlled. \par Virtually no Off periods, and she has had good control of her motor symptoms.\par Sleep is poor. When she goes to bed, she takes clonazepam easily, but she wakes up and cannot go back to sleep. She has a lot of dreams and nightmares nightly, but she cannot remember them, but she does toss, scream and talk in her sleep per her . She does not get more than 3 hours of sleep. \par She reports losing 14 lbs in the last few months, unintentionally. \par She reports more trembling in her left arm. She cannot relate it to her doses. She takes her medications regularly usually. Once in a while, she is off with the timing of her dose, especially in the evening. She feels tired throughout the day, but when she is physically doing things around the house, she feels much better.\par She does a lot of gardening as exercise. She reports that she does not feel comfortable walking. \par \par Current Medications:\par Stalevo 100 to 4 hr intervals (6 am, 10 am, 2 pm , 6 pm, 11 pm) \par Sinemet CR at 1 - 2  AM when she goes to bed.\par Gocovri 1 tablet per day \par Inbrija for OFF episodes \par \par

## 2020-07-14 ENCOUNTER — APPOINTMENT (OUTPATIENT)
Dept: NEUROLOGY | Facility: CLINIC | Age: 70
End: 2020-07-14
Payer: MEDICARE

## 2020-07-14 ENCOUNTER — RX RENEWAL (OUTPATIENT)
Age: 70
End: 2020-07-14

## 2020-07-14 DIAGNOSIS — E03.9 HYPOTHYROIDISM, UNSPECIFIED: ICD-10-CM

## 2020-07-14 PROCEDURE — 99441: CPT

## 2020-07-17 ENCOUNTER — APPOINTMENT (OUTPATIENT)
Dept: NEUROLOGY | Facility: CLINIC | Age: 70
End: 2020-07-17
Payer: MEDICARE

## 2020-07-17 PROCEDURE — 99442: CPT

## 2020-07-17 NOTE — ASSESSMENT
[FreeTextEntry1] : 68 yo woman with PD since 2006 (diagnosed in 2007), recently diagnosed with endometrial cancer, had total hysterectomy on October 29 2018, which was successful, with no complications. A number of lymphonodus were removed which were cancer free. Completed then 5 sessions of radiation, with no complications. At this point she is considered cancer-free, and had a follow-up few days ago with her oncologist. Her PD has been stable through the process, but she has developed some dyskinesias that have now been well controlled since start Gocovri. \par \par Since adjusting her Stalevo to 5 times a day she had been doing better, but she reports now fatigue and she has been affected by the COVID imposed life changes. \par \par Continue for now:\par Stalevo 100mg 5 times a day (6/7 am, 10/11 am, 3 pm , 7 pm, and 11 PM). Advised her to take her medications at consistent intervals every 4 hours. \par Sinemet CR at 1 -2 AM when she goes to bed.\par Gocovri 1 tablet per day\par Inbrija for OFF episodes \par Clonazepam 1mg at bedtime. \par Rasagiline 1 mg wd\par \par Will schedule a video visit (and refer for IT support prior to visit).\par \par Encouraged to increase exercise and physical activity and maintain an active social and intellectual life, compatible with COVID restrictions\par \par \par

## 2020-07-17 NOTE — HISTORY OF PRESENT ILLNESS
[Home] : at home, [unfilled] , at the time of the visit. [Other Location: e.g. Home (Enter Location, City,State)___] : at [unfilled] [Verbal consent obtained from patient] : the patient, [unfilled] [FreeTextEntry1] : Patient unable to connect via video; visit continued as telephone visit.\par \par 68 yo woman with PD since 2006 (diagnosed in 2007), recently diagnosed with endometrial cancer, had total hysterectomy on October 29 2018, which was successful, with no complications. A number of lymphonodus were removed which were cancer free. Completed then 5 sessions of radiation, with no complications. At this point she is considered cancer-free, and had a follow-up few days ago with her oncologist. Her PD has been stable through the process, but she has developed some dyskinesias that have now been well controlled since start Gocovri. \par \par At last visit since adjusting her Stalevo to 5 times a day she was dong  well. Freezing of gait was under better control. \par \par She reports fatigue and generally she is feeling less well.\par She reports concern for the ongoing COVID crisis. \par

## 2020-07-17 NOTE — PHYSICAL EXAM
[FreeTextEntry1] : Audio Examination\par \par Patient with no cognitive problems, reports symptoms coherently and in an organized matter, attention is normal, language and speech are normal, with normal fluency, normal vocabulary. \par No dysarthria, no slurring of speech.\par \par \par

## 2020-08-07 ENCOUNTER — APPOINTMENT (OUTPATIENT)
Dept: NEUROLOGY | Facility: CLINIC | Age: 70
End: 2020-08-07
Payer: MEDICARE

## 2020-08-07 PROCEDURE — 99215 OFFICE O/P EST HI 40 MIN: CPT | Mod: 95

## 2020-08-07 NOTE — PHYSICAL EXAM
[FreeTextEntry1] : Video Examination\par \par Patient with no cognitive problems, reports symptoms coherently and in an organized matter, attention is normal, language and speech are normal, with normal fluency, normal vocabulary. \par No dysarthria, no slurring of speech.\par \par Cranial nerve examination reveals normal EOMI, no facial asymmetry, normal facial movements, normal mouth opening and tongue protrusion, and normal tongue movements.\par \par Motor examination reveals normal ability to sustain arms in extended position, both with palms up or down, with no drift. \par No tremor.\par Rapid sequential movement and rapid alternating movements were normal, with no decrement.\par \par No coordination problem, with no dysmetria in reaching movements\par \par Patient was able to stand without help and without pressing on the chair arms.\par \par Walking for a few steps was normal\par Mild generalized dyskinesias.

## 2020-08-07 NOTE — HISTORY OF PRESENT ILLNESS
[Home] : at home, [unfilled] , at the time of the visit. [Medical Office: ([facility])___] : at the medical office located in  [Verbal consent obtained from patient] : the patient, [unfilled] [Spouse] : spouse [FreeTextEntry1] : \par 70 yo woman with PD since 2006 (diagnosed in 2007), recently diagnosed with endometrial cancer, had total hysterectomy on October 29 2018, which was successful, with no complications.A number of lymphonodus were removed which were cancer free. Completed then 5 sessions of radiation, with no complications. At this point she is considered cancer-free, and had a follow-up few days ago with her oncologist. Her PD has been stable through the process, but she has developed some dyskinesias.\par She also has developed some freezing of gait.\par \par Since starting Gocovri and adding the CR Sinemet she has been doing very well. Stopped Gocovri at the advice of her primary who felt that the medication could cause weight loss. Dyskinesias have not been a problem, stopped Gocovri 3 days ago.\par \par She feels worse, she has more Off \par She sleeps poorly, she continues to sleep about 3 hours per night.\par Gardens every day and walks in the house.\par \par Only concern is mild return of dyskinesias.\par \par Current Meds :\par \par Stalevo 100 to 4 hr intervals 5 x day  (7 am, 11 am, 3 pm , 6 pm, and 9 PM) \par  Sinemet CR at 1 -2 AM when she goes to bed.\par Requip 2 mg 2 x day\par Azilect 1 mg am\par Gocovri 1 tablet per day\par Inbrija for OFF episodes \par Atenolol 20 mg at 2 pm\par Synthroid 75 mcg\par Lipitor 10 mg\par Clonazepam 1 mg qhs

## 2020-08-07 NOTE — ASSESSMENT
[FreeTextEntry1] : \par 68 yo woman with PD since 2006 (diagnosed in 2007), recently diagnosed with endometrial cancer, had total hysterectomy on October 29 2018, which was successful, with no complications.A number of lymphonodus were removed which were cancer free. Completed then 5 sessions of radiation, with no complications. At this point she is considered cancer-free, and had a follow-up few days ago with her oncologist. Her PD has been stable through the process, but she has developed some dyskinesias.\par She also has developed some freezing of gait.\par \par Since starting Gocovri and adding the CR Sinemet she has been doing very well. However Gocovri stopped by her internist 3 days ago, because of concern for weight loss, but so far no return of dyskinesias.\par \par She reports general malaise and more off time, but she continues to sleep only 2-3 hours a night and she does little or no exercise.\par \par Plan:\par Discussed Trazodone 50 mg for sleep\par Continue current medication regimen.\par Stalevo 100 to 4 hr intervals 5 x day  (7 am, 11 am, 3 pm , 6 pm, and 9 PM) \par  Sinemet CR at 1 -2 AM when she goes to bed.\par Requip 2 mg 2 x day\par Azilect 1 mg am\par Gocovri 1 tablet per day\par Inbrija for OFF episodes \par Atenolol 20 mg at 2 pm\par Synthroid 75 mcg\par Lipitor 10 mg\par \par Gocovri 1 tablet per day can be resumed if dyskinesias return\par Inbrija for OFF episodes \par \par Encouraged to increase exercise and physical activity and maintain an active social and intellectual life, compatible with COVID restrictions\par \par More than 50 % of time was used to discuss treatment, therapeutic plan, and prognosis, and patient was counseled on lifestyle, coping, and physical and emotional wellbeing.\par

## 2020-10-14 ENCOUNTER — APPOINTMENT (OUTPATIENT)
Dept: NEUROLOGY | Facility: CLINIC | Age: 70
End: 2020-10-14

## 2020-10-26 ENCOUNTER — RX RENEWAL (OUTPATIENT)
Age: 70
End: 2020-10-26

## 2020-12-18 ENCOUNTER — APPOINTMENT (OUTPATIENT)
Dept: NEUROLOGY | Facility: CLINIC | Age: 70
End: 2020-12-18
Payer: MEDICARE

## 2020-12-18 PROCEDURE — 99214 OFFICE O/P EST MOD 30 MIN: CPT | Mod: 95

## 2020-12-18 NOTE — ASSESSMENT
[FreeTextEntry1] : 69 yo woman with PD since 2006 (diagnosed in 2007), recently diagnosed with endometrial cancer, had total hysterectomy on October 29 2018, which was successful, with no complications.A number of lymphonodus were removed which were cancer free. Completed then 5 sessions of radiation, with no complications. At this point she is considered cancer-free, and had a follow-up few days ago with her oncologist. Her PD has been stable through the process, but she has developed some dyskinesias.\par She also has developed some freezing of gait.\par \par Since starting Gocovri and adding the CR Sinemet she has been doing very well. However Gocovri stopped by her internist 3 days ago, because of concern for weight loss. However dyskinesias returned and she resumed lower dose Gocovri, with benefit, dyskinesias are improved and she is not experiencing side effects.\par \par She reports general malaise and some residual off time, usually at 1`2 noon or early afternoon,  she is sleeping a little more, and she takes a nap during the day.\par \par \par \par Plan:\par Overall she remains fairly well controlled. Sleeping a little more, and less tired.\par Continue current medication regimen.\par Stalevo 100 to 4 hr intervals 5 x day (7 am, 11 am, 3 pm , 6 pm, and 9 PM) \par  Sinemet CR at 1 -2 AM when she goes to bed.\par Requip 2 mg 2 x day\par Azilect 1 mg am\par Gocovri 1 tablet per day\par Inbrija for OFF episodes \par Atenolol 20 mg at 2 pm\par \par Consider Trazodone for sleep.

## 2020-12-18 NOTE — HISTORY OF PRESENT ILLNESS
[Home] : at home, [unfilled] , at the time of the visit. [Other Location: e.g. Home (Enter Location, City,State)___] : at [unfilled] [Verbal consent obtained from patient] : the patient, [unfilled] [FreeTextEntry1] : \par Unable to establish video connection, visit continued by audio only.\par \par 71 yo woman with PD since 2006 (diagnosed in 2007), recently diagnosed with endometrial cancer, had total hysterectomy on October 29 2018, which was successful, with no complications.A number of lymphonodus were removed which were cancer free. Completed then 5 sessions of radiation, with no complications. At this point she is considered cancer-free, and had a follow-up few days ago with her oncologist. Her PD has been stable through the process, but she has developed some dyskinesias.\par She also has developed some freezing of gait.\par \par Since starting Gocovri and adding the CR Sinemet she has been doing very well. However Gocovri stopped by her internist 3 days ago, because of concern for weight loss. However dyskinesias returned and she resumed lower dose Gocovri, with benefit, dyskinesias are improved and she is not experiencing side effects.\par \par She reports general malaise and some residual off time, usually at 1`2 noon or early afternoon,  she is sleeping a little more, and she takes a nap during the day.\par \par No new medical problems, no recurrence of the cancer.\par \par Medications:\par Stalevo 100 to 4 hr intervals 5 x day (7 am, 11 am, 3 pm , 6 pm, and 9 PM) \par  Sinemet CR at 1 -2 AM when she goes to bed.\par Requip 2 mg 2 x day\par Azilect 1 mg am\par Gocovri 1 tablet per day\par Inbrija for OFF episodes \par Atenolol 20 mg at 2 pm\par Synthroid 75 mcg\par Lipitor 10 mg\par

## 2020-12-18 NOTE — PHYSICAL EXAM
[FreeTextEntry1] : \par Audio Examination\par \par Patient with no cognitive problems, reports symptoms coherently and in an organized matter, attention is normal, language and speech are normal, with normal fluency, normal vocabulary. \par No dysarthria, no slurring of speech.\par

## 2021-01-15 ENCOUNTER — APPOINTMENT (OUTPATIENT)
Dept: NEUROLOGY | Facility: CLINIC | Age: 71
End: 2021-01-15
Payer: MEDICARE

## 2021-01-15 PROCEDURE — 99215 OFFICE O/P EST HI 40 MIN: CPT | Mod: 95

## 2021-01-15 PROCEDURE — 99211 OFF/OP EST MAY X REQ PHY/QHP: CPT | Mod: 25,95

## 2021-01-15 NOTE — ASSESSMENT
[FreeTextEntry1] : 71 yo woman with PD since 2006 (diagnosed in 2007), recently diagnosed with endometrial cancer, had total hysterectomy on October 29 2018, which was successful, with no complications.A number of lymphonodus were removed which were cancer free. Completed then 5 sessions of radiation, with no complications. At this point she is considered cancer-free, and had a follow-up few days ago with her oncologist. Her PD has been stable through the process, but she has developed some dyskinesias.\par She also has developed some freezing of gait.\par \par Since starting Gocovri and adding the CR Sinemet she has been doing very well. However Gocovri stopped by her internist 3 days ago, because of concern for weight loss. She has resumed one Gocovri once day which has been effective controlling her dyskinesias. \par \par No significant wearing off, unless she is late with her dosing. She is more regular with her medication.\par \par Overall she is stable or even slightly better, with no acute or new problems.\par \par Plan:\par Overall stable or even a little better, with controlled \par Continue current medication regimen.\par Refer to Dance for PD\par \par Encouraged to increase exercise and physical activity and maintain an active social and intellectual life, compatible with COVID restrictions\par \par More than 50 % of time was used to discuss treatment, therapeutic plan, and prognosis, and patient was counseled on lifestyle, coping, and physical and emotional wellbeing.\par \par \par

## 2021-01-15 NOTE — PHYSICAL EXAM
[FreeTextEntry1] : Video Examination\par \par Patient with no cognitive problems, reports symptoms coherently and in an organized matter, attention is normal, language and speech are normal, with normal fluency, normal vocabulary. \par No dysarthria, no slurring of speech.\par \par Cranial nerve examination reveals normal EOMI, no facial asymmetry, normal facial movements, normal mouth opening and tongue protrusion, and normal tongue movements.\par \par Motor examination reveals normal ability to sustain arms in extended position, both with palms up or down, with no drift. \par No tremor.\par Rapid sequential movement and rapid alternating movements were normal, with no decrement.\par \par No coordination problem, with no dysmetria in reaching movements\par \par Patient was able to stand without help and without pressing on the chair arms.\par \par Walking for a few steps was normal with some decrease L arm swing\par Mild generalized dyskinesias.

## 2021-01-15 NOTE — HISTORY OF PRESENT ILLNESS
[Home] : at home, [unfilled] , at the time of the visit. [Other Location: e.g. Home (Enter Location, City,State)___] : at [unfilled] [Spouse] : spouse [Verbal consent obtained from patient] : the patient, [unfilled] [FreeTextEntry1] : \par 71 yo woman with PD since 2006 (diagnosed in 2007), recently diagnosed with endometrial cancer, had total hysterectomy on October 29 2018, which was successful, with no complications.A number of lymphonodus were removed which were cancer free. Completed then 5 sessions of radiation, with no complications. At this point she is considered cancer-free, and had a follow-up few days ago with her oncologist. Her PD has been stable through the process, but she has developed some dyskinesias.\par She also has developed some freezing of gait.\par \par Since starting Gocovri and adding the CR Sinemet she has been doing very well. However Gocovri stopped by her internist 3 days ago, because of concern for weight loss. She has resumed one Gocovri once day which has been effective controlling her dyskinesias. \par \par Mood is worse, she is a little depressed. \par Anxiety is worse\par She remains very sedentary.\par

## 2021-02-02 ENCOUNTER — APPOINTMENT (OUTPATIENT)
Dept: NEUROLOGY | Facility: CLINIC | Age: 71
End: 2021-02-02
Payer: MEDICARE

## 2021-02-02 ENCOUNTER — NON-APPOINTMENT (OUTPATIENT)
Age: 71
End: 2021-02-02

## 2021-02-02 PROCEDURE — 99215 OFFICE O/P EST HI 40 MIN: CPT | Mod: 95

## 2021-02-02 RX ORDER — TRAZODONE HYDROCHLORIDE 50 MG/1
50 TABLET ORAL
Qty: 90 | Refills: 3 | Status: DISCONTINUED | COMMUNITY
Start: 2020-08-07 | End: 2021-02-02

## 2021-04-21 ENCOUNTER — NON-APPOINTMENT (OUTPATIENT)
Age: 71
End: 2021-04-21

## 2021-05-26 ENCOUNTER — APPOINTMENT (OUTPATIENT)
Dept: NEUROLOGY | Facility: CLINIC | Age: 71
End: 2021-05-26
Payer: MEDICARE

## 2021-05-26 VITALS
DIASTOLIC BLOOD PRESSURE: 66 MMHG | HEART RATE: 74 BPM | TEMPERATURE: 98 F | SYSTOLIC BLOOD PRESSURE: 107 MMHG | OXYGEN SATURATION: 96 % | HEIGHT: 66.14 IN | WEIGHT: 149 LBS | BODY MASS INDEX: 23.95 KG/M2 | RESPIRATION RATE: 18 BRPM

## 2021-05-26 VITALS — DIASTOLIC BLOOD PRESSURE: 64 MMHG | SYSTOLIC BLOOD PRESSURE: 103 MMHG | OXYGEN SATURATION: 96 % | HEART RATE: 76 BPM

## 2021-05-26 DIAGNOSIS — G47.19 OTHER HYPERSOMNIA: ICD-10-CM

## 2021-05-26 PROCEDURE — 99215 OFFICE O/P EST HI 40 MIN: CPT

## 2021-05-26 PROCEDURE — 99072 ADDL SUPL MATRL&STAF TM PHE: CPT

## 2021-06-01 ENCOUNTER — RX RENEWAL (OUTPATIENT)
Age: 71
End: 2021-06-01

## 2021-06-16 ENCOUNTER — APPOINTMENT (OUTPATIENT)
Dept: NEUROLOGY | Facility: CLINIC | Age: 71
End: 2021-06-16

## 2021-06-18 ENCOUNTER — APPOINTMENT (OUTPATIENT)
Dept: NEUROLOGY | Facility: CLINIC | Age: 71
End: 2021-06-18
Payer: MEDICARE

## 2021-06-18 PROCEDURE — 99215 OFFICE O/P EST HI 40 MIN: CPT | Mod: 95

## 2021-06-18 RX ORDER — MODAFINIL 100 MG/1
100 TABLET ORAL
Qty: 180 | Refills: 1 | Status: ACTIVE | COMMUNITY
Start: 2021-05-26 | End: 1900-01-01

## 2021-06-18 NOTE — ASSESSMENT
[FreeTextEntry1] : \par Patient's  and 2 children were present at this visit\par \par 69 yo woman with PD since 2006 (diagnosed in 2007), recently diagnosed with endometrial cancer, had total hysterectomy on October 29 2018, which was successful, with no complications.A number of lymphonodus were removed which were cancer free. Completed then 5 sessions of radiation, with no complications. At this point she is considered cancer-free, and had a follow-up few days ago with her oncologist. Her PD has been stable through the process, but she has developed some dyskinesias.\par She also has developed some freezing of gait.\par \par Since starting Gocovri and adding the CR Sinemet she has been doing very well. However Gocovri stopped by her internist 3 days ago, because of concern for weight loss. She has resumed one Gocovri once day which has been effective partially controlling her dyskinesias. She has more dyskinesias in the late morning and late afternoon.\par \par Her main problems remain depression, and dyskinesias. She tried Gocovri at bedtime only and she felt the dose was not sufficient, added a second pill in AM, and felt drowsy.\par \par \par A/Plan:\par Discussed with patient and family overall status of the disease and likely prognosis, and addressed depression and family dynamics.\par Change Gocovri to 2 tabs at bedtime to avoid drowsiness.\par Did not Provigil 100 mg at 7 am and 100 mg at 3 Pm as she did not receive the Rx for the drug. Will prescribe again.\par Remeron 15 mg qhs, not started \par Discussed again [facility] admission, patient interested\par \par More than 50 % of time was used to discuss treatment, therapeutic plan, and prognosis, and patient was counseled on lifestyle, coping, and physical and emotional wellbeing.\par \par

## 2021-06-18 NOTE — PHYSICAL EXAM
[FreeTextEntry1] : Video Examination\par \par Patient with no cognitive problems, reports symptoms coherently and in an organized matter, attention is normal, language and speech are normal, with normal fluency, normal vocabulary. \par No dysarthria, no slurring of speech.\par \par Very mild bradykinesia, no dyskinesias at this examination.

## 2021-06-18 NOTE — HISTORY OF PRESENT ILLNESS
[Home] : at home, [unfilled] , at the time of the visit. [Other Location: e.g. Home (Enter Location, City,State)___] : at [unfilled] [Verbal consent obtained from patient] : the patient, [unfilled] [FreeTextEntry1] : \par Patient's  and 2 children were present at this visit\par \par 71 yo woman with PD since 2006 (diagnosed in 2007), recently diagnosed with endometrial cancer, had total hysterectomy on October 29 2018, which was successful, with no complications.A number of lymphonodus were removed which were cancer free. Completed then 5 sessions of radiation, with no complications. At this point she is considered cancer-free, and had a follow-up few days ago with her oncologist. Her PD has been stable through the process, but she has developed some dyskinesias.\par She also has developed some freezing of gait.\par \par Since starting Gocovri and adding the CR Sinemet she has been doing very well. However Gocovri stopped by her internist 3 days ago, because of concern for weight loss. She has resumed one Gocovri once day which has been effective partially controlling her dyskinesias. She has more dyskinesias in the late morning and late afternoon.\par \par Her main problems remain depression, and dyskinesias. She tried Gocovri at bedtime only and she felt the dose was not sufficient, added a second pill in AM, and felt drowsy.\par

## 2021-11-22 ENCOUNTER — NON-APPOINTMENT (OUTPATIENT)
Age: 71
End: 2021-11-22

## 2021-11-24 ENCOUNTER — APPOINTMENT (OUTPATIENT)
Dept: NEUROLOGY | Facility: CLINIC | Age: 71
End: 2021-11-24
Payer: MEDICARE

## 2021-11-24 PROCEDURE — 99215 OFFICE O/P EST HI 40 MIN: CPT | Mod: 95
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
## 2021-11-24 NOTE — PHYSICAL EXAM
[FreeTextEntry1] : \par \par Video Examination\par Patient with no cognitive problems, reports symptoms coherently and in an organized matter, attention is normal, language and speech are normal, with normal fluency, normal vocabulary. \par No dysarthria, no slurring of speech.\par \par Cranial nerve examination reveals normal EOMI, no facial asymmetry, normal facial movements, normal mouth opening and tongue protrusion, and normal tongue movements.\par \par Motor examination reveals normal ability to sustain arms in extended position, both with palms up or down, with no drift. \par No tremor.\par Rapid sequential movement and rapid alternating movements were normal, with no decrement.\par \par No coordination problem, with no dysmetria in reaching movements\par \par Patient was able to stand  pressing on the chair arms.\par Mild dyskinesias\par

## 2021-11-30 ENCOUNTER — NON-APPOINTMENT (OUTPATIENT)
Age: 71
End: 2021-11-30

## 2021-12-02 ENCOUNTER — NON-APPOINTMENT (OUTPATIENT)
Age: 71
End: 2021-12-02

## 2022-02-10 ENCOUNTER — APPOINTMENT (OUTPATIENT)
Dept: NEUROLOGY | Facility: CLINIC | Age: 72
End: 2022-02-10
Payer: MEDICARE

## 2022-02-10 PROCEDURE — 99215 OFFICE O/P EST HI 40 MIN: CPT | Mod: 95

## 2022-02-10 NOTE — PHYSICAL EXAM
[FreeTextEntry1] : Audio Examination\par Patient with no cognitive problems, reports symptoms coherently and in an organized matter, attention is normal, language and speech are normal, with normal fluency, normal vocabulary. \par No dysarthria, no slurring of speech.\par \par
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
## 2022-02-14 ENCOUNTER — APPOINTMENT (OUTPATIENT)
Dept: NEUROLOGY | Facility: CLINIC | Age: 72
End: 2022-02-14
Payer: MEDICARE

## 2022-02-14 PROCEDURE — 99215 OFFICE O/P EST HI 40 MIN: CPT | Mod: 95

## 2022-02-17 ENCOUNTER — APPOINTMENT (OUTPATIENT)
Dept: NEUROLOGY | Facility: CLINIC | Age: 72
End: 2022-02-17

## 2022-04-18 ENCOUNTER — RX RENEWAL (OUTPATIENT)
Age: 72
End: 2022-04-18

## 2022-04-18 RX ORDER — RASAGILINE 1 MG/1
1 TABLET ORAL DAILY
Qty: 90 | Refills: 0 | Status: ACTIVE | COMMUNITY
Start: 2021-06-01 | End: 1900-01-01

## 2022-05-28 ENCOUNTER — FORM ENCOUNTER (OUTPATIENT)
Age: 72
End: 2022-05-28

## 2022-06-01 ENCOUNTER — APPOINTMENT (OUTPATIENT)
Dept: NEUROLOGY | Facility: CLINIC | Age: 72
End: 2022-06-01
Payer: MEDICARE

## 2022-06-01 PROCEDURE — 99442: CPT | Mod: NC

## 2022-06-13 RX ORDER — AMANTADINE 68.5 MG/1
68.5 CAPSULE, COATED PELLETS ORAL
Qty: 270 | Refills: 3 | Status: ACTIVE | COMMUNITY
Start: 2021-01-12 | End: 1900-01-01

## 2022-07-06 ENCOUNTER — APPOINTMENT (OUTPATIENT)
Dept: NEUROLOGY | Facility: CLINIC | Age: 72
End: 2022-07-06

## 2022-07-06 PROCEDURE — 99215 OFFICE O/P EST HI 40 MIN: CPT | Mod: 95
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
## 2022-07-06 NOTE — PHYSICAL EXAM
[FreeTextEntry1] : Video Examination\par Patient with no cognitive problems, reports symptoms coherently and in an organized matter, attention is normal, language and speech are normal, with normal fluency, normal vocabulary. \par No dysarthria, no slurring of speech.\par \par Cranial nerve examination reveals normal EOMI, no facial asymmetry, normal facial movements, normal mouth opening and tongue protrusion, and normal tongue movements.\par \par Motor examination reveals normal ability to sustain arms in extended position, both with palms up or down, with no drift. \par No tremor.\par Rapid sequential movement and rapid alternating movements were normal, with no decrement.\par \par No coordination problem, with no dysmetria in reaching movements\par \par Patient was able to stand pressing on the chair arms.\par Mild dyskinesias, generalized

## 2022-11-25 RX ORDER — LEVOTHYROXINE SODIUM 0.07 MG/1
75 TABLET ORAL DAILY
Qty: 90 | Refills: 3 | Status: ACTIVE | COMMUNITY
Start: 1900-01-01 | End: 1900-01-01

## 2022-12-02 ENCOUNTER — RX RENEWAL (OUTPATIENT)
Age: 72
End: 2022-12-02

## 2022-12-02 RX ORDER — MIRTAZAPINE 15 MG/1
15 TABLET, FILM COATED ORAL
Qty: 90 | Refills: 0 | Status: ACTIVE | COMMUNITY
Start: 2021-11-24 | End: 1900-01-01

## 2023-02-09 RX ORDER — CLONAZEPAM 1 MG/1
1 TABLET ORAL
Qty: 180 | Refills: 1 | Status: ACTIVE | COMMUNITY
Start: 2020-01-21 | End: 1900-01-01

## 2023-02-09 RX ORDER — CARBIDOPA AND LEVODOPA 25; 100 MG/1; MG/1
25-100 TABLET, EXTENDED RELEASE ORAL
Qty: 180 | Refills: 3 | Status: ACTIVE | COMMUNITY
Start: 2019-10-02 | End: 1900-01-01

## 2023-04-28 ENCOUNTER — APPOINTMENT (RX ONLY)
Dept: URBAN - METROPOLITAN AREA CLINIC 144 | Facility: CLINIC | Age: 73
Setting detail: DERMATOLOGY
End: 2023-04-28

## 2023-04-28 DIAGNOSIS — I83029 VARICOSE VEINS OF LOWER EXTREMITIES WITH ULCER: ICD-10-CM

## 2023-04-28 DIAGNOSIS — I83019 VARICOSE VEINS OF LOWER EXTREMITIES WITH ULCER: ICD-10-CM

## 2023-04-28 DIAGNOSIS — I87.2 VENOUS INSUFFICIENCY (CHRONIC) (PERIPHERAL): ICD-10-CM | Status: INADEQUATELY CONTROLLED

## 2023-04-28 DIAGNOSIS — I83009 VARICOSE VEINS OF LOWER EXTREMITIES WITH ULCER: ICD-10-CM

## 2023-04-28 PROBLEM — L97.829 NON-PRESSURE CHRONIC ULCER OF OTHER PART OF LEFT LOWER LEG WITH UNSPECIFIED SEVERITY: Status: ACTIVE | Noted: 2023-04-28

## 2023-04-28 PROCEDURE — ? UNNA BOOT APPLICATION

## 2023-04-28 PROCEDURE — 29580 STRAPPING UNNA BOOT: CPT | Mod: LT

## 2023-04-28 PROCEDURE — ? COUNSELING

## 2023-04-28 PROCEDURE — 99202 OFFICE O/P NEW SF 15 MIN: CPT | Mod: 25

## 2023-04-28 ASSESSMENT — LOCATION SIMPLE DESCRIPTION DERM: LOCATION SIMPLE: LEFT PRETIBIAL REGION

## 2023-04-28 ASSESSMENT — LOCATION DETAILED DESCRIPTION DERM: LOCATION DETAILED: LEFT DISTAL PRETIBIAL REGION

## 2023-04-28 ASSESSMENT — LOCATION ZONE DERM: LOCATION ZONE: LEG

## 2023-04-28 NOTE — PROCEDURE: UNNA BOOT APPLICATION
Detail Level: Detailed
Was A Previous Unna Boot Removed?: No
Zinc Paste Impregnated Bandage Units: 1
Removal Of Previous Unna Boot (No) Text: The site was cleaned in preparation for an [facility] application.
After Unna Boot Application Text: Coban was used to wrap the outside of Katherne [employer]s and we ensured the Katherne [employer]s wasn't too tight prior to the patient leaving the office.
Removal Of Previous Unna Boot (Yes) Text: The previous [employer] was removed and then the site was cleaned in preparation for another [employer] application.
Medication Occluded Under Unnaboot: Clobetasol
Indication: stasis ulcer
Location Applied: the left leg

## 2023-05-02 ENCOUNTER — APPOINTMENT (RX ONLY)
Dept: URBAN - METROPOLITAN AREA CLINIC 144 | Facility: CLINIC | Age: 73
Setting detail: DERMATOLOGY
End: 2023-05-02

## 2023-05-02 DIAGNOSIS — I83009 VARICOSE VEINS OF LOWER EXTREMITIES WITH ULCER: ICD-10-CM

## 2023-05-02 DIAGNOSIS — I87.2 VENOUS INSUFFICIENCY (CHRONIC) (PERIPHERAL): ICD-10-CM

## 2023-05-02 DIAGNOSIS — I83019 VARICOSE VEINS OF LOWER EXTREMITIES WITH ULCER: ICD-10-CM

## 2023-05-02 DIAGNOSIS — I83029 VARICOSE VEINS OF LOWER EXTREMITIES WITH ULCER: ICD-10-CM

## 2023-05-02 PROBLEM — L97.829 NON-PRESSURE CHRONIC ULCER OF OTHER PART OF LEFT LOWER LEG WITH UNSPECIFIED SEVERITY: Status: ACTIVE | Noted: 2023-05-02

## 2023-05-02 PROCEDURE — 29580 STRAPPING UNNA BOOT: CPT | Mod: LT

## 2023-05-02 PROCEDURE — ? UNNA BOOT APPLICATION

## 2023-05-02 PROCEDURE — 99212 OFFICE O/P EST SF 10 MIN: CPT | Mod: 25

## 2023-05-02 PROCEDURE — ? COUNSELING

## 2023-05-02 ASSESSMENT — LOCATION DETAILED DESCRIPTION DERM: LOCATION DETAILED: LEFT DISTAL PRETIBIAL REGION

## 2023-05-02 ASSESSMENT — LOCATION ZONE DERM: LOCATION ZONE: LEG

## 2023-05-02 ASSESSMENT — LOCATION SIMPLE DESCRIPTION DERM: LOCATION SIMPLE: LEFT PRETIBIAL REGION

## 2023-05-02 NOTE — PROCEDURE: UNNA BOOT APPLICATION
Detail Level: Detailed
Was A Previous Unna Boot Removed?: Yes
Zinc Paste Impregnated Bandage Units: 1
Removal Of Previous Unna Boot (No) Text: The site was cleaned in preparation for an [facility] application.
After Unna Boot Application Text: Coban was used to wrap the outside of [employer] and we ensured the [employer] wasn't too tight prior to the patient leaving the office.
Bill For Unna Boot Supplies?: No
Removal Of Previous Unna Boot (Yes) Text: The previous [employer] was removed and then the site was cleaned in preparation for another [employer] application.
Medication Occluded Under Unnaboot: Clobetasol
Indication: stasis ulcer
Location Applied: the left leg

## 2023-05-04 ENCOUNTER — APPOINTMENT (RX ONLY)
Dept: URBAN - METROPOLITAN AREA CLINIC 144 | Facility: CLINIC | Age: 73
Setting detail: DERMATOLOGY
End: 2023-05-04

## 2023-05-04 DIAGNOSIS — I83019 VARICOSE VEINS OF LOWER EXTREMITIES WITH ULCER: ICD-10-CM

## 2023-05-04 DIAGNOSIS — I87.2 VENOUS INSUFFICIENCY (CHRONIC) (PERIPHERAL): ICD-10-CM

## 2023-05-04 DIAGNOSIS — I83009 VARICOSE VEINS OF LOWER EXTREMITIES WITH ULCER: ICD-10-CM

## 2023-05-04 DIAGNOSIS — I83029 VARICOSE VEINS OF LOWER EXTREMITIES WITH ULCER: ICD-10-CM

## 2023-05-04 PROBLEM — L97.829 NON-PRESSURE CHRONIC ULCER OF OTHER PART OF LEFT LOWER LEG WITH UNSPECIFIED SEVERITY: Status: ACTIVE | Noted: 2023-05-04

## 2023-05-04 PROCEDURE — ? UNNA BOOT APPLICATION

## 2023-05-04 PROCEDURE — 29580 STRAPPING UNNA BOOT: CPT | Mod: LT

## 2023-05-04 PROCEDURE — ? COUNSELING

## 2023-05-04 PROCEDURE — 99212 OFFICE O/P EST SF 10 MIN: CPT | Mod: 25

## 2023-05-04 ASSESSMENT — LOCATION ZONE DERM: LOCATION ZONE: LEG

## 2023-05-04 ASSESSMENT — LOCATION SIMPLE DESCRIPTION DERM: LOCATION SIMPLE: LEFT PRETIBIAL REGION

## 2023-05-04 ASSESSMENT — LOCATION DETAILED DESCRIPTION DERM: LOCATION DETAILED: LEFT DISTAL PRETIBIAL REGION

## 2023-05-04 NOTE — PROCEDURE: UNNA BOOT APPLICATION
Detail Level: Detailed
Was A Previous Unna Boot Removed?: Yes
Zinc Paste Impregnated Bandage Units: 1
Removal Of Previous Unna Boot (No) Text: The site was cleaned in preparation for an [facility] application.
After Unna Boot Application Text: Coban was used to wrap the outside of [employer] and we ensured the [employer] wasn't too tight prior to the patient leaving the office.
Bill For Unna Boot Supplies?: No
Removal Of Previous Unna Boot (Yes) Text: The previous [employer] was removed and then the site was cleaned in preparation for another [employer] application.
Medication Occluded Under Unnaboot: Clobetasol
Indication: stasis ulcer
Location Applied: the left leg

## 2023-06-29 NOTE — DISCUSSION/SUMMARY
[FreeTextEntry1] : In my opinion, SARY SHARP , exhibits substantial decline from a prior level of function with loss of functional independence within the last 3-6 months.She failed outpatient and Home PT/OT program in the recent past.In order to maximize her neurorehabilitation potential she requires a short  acute  IRF admission to provide intensive rehabilitation therapy. It is  essential  that acute rehabilitation therapy is delivered at the specialized Parkinson's rehabilitation unit. PD rehabilitation program at [facility] is a unique program, first in the country. During such IFR admission PD patient's  individual goals are determined and  therapy is provided by the staff uniquely trained in Parkinsons rehabilitation techniques and strategies using  specialized equipment and technology. Rehabilitation intervention is coordinated with an input from Movement disorder neurologists to enhance beneficial outcome. \par \par     Patient requires complex nursing and medical management of Parkinson's disease due to deteriorating motor fluctuations, autonomic and non-motor complications, cognitive and mood impartment.Patient requires daily medical monitoring and medication adjustment provided by a rehabilitation physician.\par \par     Patient has multiple comorbidities contributing to functional disability with potential risk of fall with further injuries.These medical comorbidities also contribute to potential clinical complications, such as orthostatic hypotension with diminished cerebral perfusion leading to loss of balance and further compromising gait stability; DSD, Lumbar stenosis with recent exacerbation on the LBP after falls, severe osteoarthritis, diabetic peripheral neuropathy that are contributing to gait impairment and fall risk; neurogenic bladder with incontinence, frequency and urgency, possible overflow incontinence leading to potential renal damage, UTI and skin breakdown; visual impairment, depression, anxiety and cognitive impairment that negatively affect her functional independence and quality of life. \par \par    All these medical issues can be addressed and remediated during acute inpatient rehabilitation admission  under daily supervision of a rehabilitation physician and needed medication adjustment.\par \par     Patient exhibits extensive rehabilitation needs due to poor balance and ambulation with frequent falls and recent injuries, declined functional transfers and ADLs, dysphagia, hypophonia and existing home barriers, such as numerous steps in the house, split level house, lack of DME, poor bathroom and bedroom accommodations, increased fall risk, impulsivity, visual and cognitive impartment, limited family support. \par \par Given all these factors patient requires the rehabilitation, nursing and medical care  to be delivered under the supervision of a rehabilitation physician in a form of intensive and coordinated interdisciplinary approach.\par \par I expect the patient  to actively participate in and objectively and meaningfully benefit  from a short acute inpatient rehabilitation program and achieve higher level of independence with improved quality of life with expected level of function as MODIFIED INDEPENDENT. The program will include physical therapy, occupational  therapy and speech/swallow therapy provided 3 hours a day, 5 days per week.\par \par \par Anticipated discharge destination: Home \par \par Any anticipated post-discharge treatments: maintenance outpatient PT/OT/ST program, improved community access with involvement in community resource groups.\par  \par \par \par \par \par . \par \par 
rolling walker

## 2023-09-27 ENCOUNTER — APPOINTMENT (OUTPATIENT)
Dept: NEUROLOGY | Facility: CLINIC | Age: 73
End: 2023-09-27

## 2023-11-28 RX ORDER — ROPINIROLE 2 MG/1
2 TABLET, FILM COATED ORAL
Qty: 270 | Refills: 3 | Status: ACTIVE | COMMUNITY
Start: 2019-06-27 | End: 1900-01-01

## 2024-02-16 RX ORDER — CARBIDOPA, LEVODOPA AND ENTACAPONE 25; 100; 200 MG/1; MG/1; MG/1
25-100-200 TABLET, FILM COATED ORAL
Qty: 450 | Refills: 1 | Status: ACTIVE | COMMUNITY
Start: 2019-03-20 | End: 1900-01-01

## 2024-06-19 ENCOUNTER — APPOINTMENT (OUTPATIENT)
Dept: NEUROLOGY | Facility: CLINIC | Age: 74
End: 2024-06-19
Payer: MEDICARE

## 2024-06-19 PROCEDURE — 99215 OFFICE O/P EST HI 40 MIN: CPT | Mod: 95

## 2024-06-19 PROCEDURE — G2211 COMPLEX E/M VISIT ADD ON: CPT | Mod: 95

## 2024-06-20 NOTE — PHYSICAL EXAM
[FreeTextEntry1] : Patient evaluated from hospital bed, with family members, daughter, son, spouse, providing medical information. Patient unable to provide medical information, being confused, lethargic, and very dysarthric. She recognizes me in the video call, and has some direct social interaction.  She is able to follow simple 1 step commands ("touch your head", raise hands")
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
## 2024-06-21 RX ORDER — PIMAVANSERIN TARTRATE 34 MG/1
34 CAPSULE ORAL
Qty: 30 | Refills: 4 | Status: ACTIVE | COMMUNITY
Start: 2024-06-19

## 2024-10-09 ENCOUNTER — APPOINTMENT (OUTPATIENT)
Dept: NEUROLOGY | Facility: CLINIC | Age: 74
End: 2024-10-09
Payer: MEDICARE

## 2024-10-09 DIAGNOSIS — G20.A1 PARKINSON'S DISEASE WITHOUT DYSKINESIA, WITHOUT MENTION OF FLUCTUATIONS: ICD-10-CM

## 2024-10-09 DIAGNOSIS — Z74.09 OTHER REDUCED MOBILITY: ICD-10-CM

## 2024-10-09 DIAGNOSIS — R45.1 RESTLESSNESS AND AGITATION: ICD-10-CM

## 2024-10-09 DIAGNOSIS — R29.6 REPEATED FALLS: ICD-10-CM

## 2024-10-09 DIAGNOSIS — G47.19 OTHER HYPERSOMNIA: ICD-10-CM

## 2024-10-09 PROCEDURE — G2211 COMPLEX E/M VISIT ADD ON: CPT | Mod: 95

## 2024-10-09 PROCEDURE — 99215 OFFICE O/P EST HI 40 MIN: CPT | Mod: 95

## 2024-10-09 RX ORDER — CARBIDOPA, LEVODOPA AND ENTACAPONE 37.5; 150; 2 MG/1; MG/1; MG/1
37.5-150-2 TABLET, FILM COATED ORAL
Qty: 360 | Refills: 3 | Status: ACTIVE | COMMUNITY
Start: 2024-10-09 | End: 1900-01-01

## 2024-10-09 RX ORDER — CLONAZEPAM 0.25 MG/1
0.25 TABLET, ORALLY DISINTEGRATING ORAL
Qty: 240 | Refills: 0 | Status: ACTIVE | COMMUNITY
Start: 2024-10-09 | End: 1900-01-01

## 2024-10-10 ENCOUNTER — RX RENEWAL (OUTPATIENT)
Age: 74
End: 2024-10-10

## 2025-03-27 ENCOUNTER — RX RENEWAL (OUTPATIENT)
Age: 75
End: 2025-03-27

## 2025-08-29 ENCOUNTER — RX RENEWAL (OUTPATIENT)
Age: 75
End: 2025-08-29